# Patient Record
Sex: FEMALE | Race: BLACK OR AFRICAN AMERICAN
[De-identification: names, ages, dates, MRNs, and addresses within clinical notes are randomized per-mention and may not be internally consistent; named-entity substitution may affect disease eponyms.]

---

## 2017-07-23 ENCOUNTER — HOSPITAL ENCOUNTER (EMERGENCY)
Dept: HOSPITAL 61 - ER | Age: 57
Discharge: HOME | End: 2017-07-23
Payer: COMMERCIAL

## 2017-07-23 VITALS — DIASTOLIC BLOOD PRESSURE: 105 MMHG | SYSTOLIC BLOOD PRESSURE: 165 MMHG

## 2017-07-23 VITALS — HEIGHT: 67 IN | BODY MASS INDEX: 20.4 KG/M2 | WEIGHT: 130 LBS
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
## 2017-07-23 DIAGNOSIS — M54.5: Primary | ICD-10-CM
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
## 2017-07-23 PROCEDURE — 99281 EMR DPT VST MAYX REQ PHY/QHP: CPT
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
## 2017-11-14 ENCOUNTER — HOSPITAL ENCOUNTER (EMERGENCY)
Dept: HOSPITAL 61 - ER | Age: 57
Discharge: HOME | End: 2017-11-14
Payer: COMMERCIAL

## 2017-11-14 VITALS — SYSTOLIC BLOOD PRESSURE: 144 MMHG | DIASTOLIC BLOOD PRESSURE: 92 MMHG

## 2017-11-14 VITALS — BODY MASS INDEX: 19.26 KG/M2 | HEIGHT: 69 IN | WEIGHT: 130 LBS
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
## 2017-11-14 DIAGNOSIS — I10: ICD-10-CM
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
## 2017-11-14 DIAGNOSIS — S63.501A: Primary | ICD-10-CM

## 2017-11-14 PROCEDURE — 29125 APPL SHORT ARM SPLINT STATIC: CPT

## 2017-11-14 PROCEDURE — 73110 X-RAY EXAM OF WRIST: CPT

## 2017-11-14 NOTE — RAD
Right wrist, 3 views, 11/14/2017:



History: Wrist pain



No acute fracture or dislocation is identified. There is an old healed fifth

metacarpal fracture. Small calcific densities in the soft tissues along the

ulnar aspect of the wrist appear old. There are mild degenerative changes at

the radiocarpal articulation and the first CMC joint. Cystic and sclerotic

changes in the lunate bone are probably on a degenerative basis. Old avascular

necrosis is less likely.





IMPRESSION:

1. Scattered degenerative changes as described above.

2. No acute bony abnormality is detected.

## 2017-11-14 NOTE — PHYS DOC
Past Medical History


Past Medical History:  Hypertension, Other


Past Surgical History:  Other


Additional Past Surgical Histo:  CARPAL TUNNEL RELEASE, TRIGGER FINGER RELEASE, 

L KNEE SHAVING


Alcohol Use:  Occasionally


Drug Use:  None





Adult General


Chief Complaint


Chief Complaint:  WRIST PAIN





HPI


HPI





Patient is a 57  year old female who presents here today complaining of right 

wrist pain that started approximately 2 days ago. Patient reports she was 

downstairs working on laundry in her basement 2 days ago when she thinks she 

must and picked up her laundry basket funny and started developing pain to her 

right wrist. Patient has no other complaints at this time. Patient has any 

weakness to her hand or wrist however she does have pain to the medial and 

lateral aspect of her distal radius and ulna. Patient denies any history 

significant for hypertension diabetes lung liver or kidney problems. Patient 

reports that lately and she's taken assist herself with the pain is shot of 

vodka last night and help her get some sleep however she reports that the shot 

of vodka did not do anything for pain nor did help her get any sleep. Patient 

is concerned because she is just finishing up in 5 days of medication and she 

is history of back to work tomorrow and is needing a work note because she was 

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





Review of systems:


Constitutional: Denies fever or chills 


Eyes: Denies change in visual acuity, redness, or eye pain 


HENT: Denies nasal congestion or sore throat 


Respiratory: Denies cough or shortness of breath 





Physical exam


Constitutional: Well developed, well nourished, no acute distress, non-toxic 

appearance. 


HENT: Normocephalic, atraumatic, bilateral external ears normal, oropharynx 

moist, no oral exudates, nose normal. 


Eyes: PERRLA, EOMI, conjunctiva normal, no discharge.  


Neck: Normal range of motion, no tenderness, supple, no stridor.  


Cardiovascular:Heart rate regular rhythm, 


Lungs & Thorax:  Bilateral breath sounds clear to auscultation 


Abdomen: Bowel sounds normal, soft, no tenderness, no masses, no pulsatile 

masses.  


Skin: Warm, dry, no erythema, no rash.  


Back: No tenderness, no CVA tenderness.  


Extremities: No tenderness, no cyanosis, no clubbing, ROM intact, no edema.  


Neurologic: Alert and oriented X 3, normal motor function, normal sensory 

function, no focal deficits noted. 


Psychologic: Affect normal, judgement normal, mood normal. 








Patient's ER physical exam is significant for tenderness to palpation to her 

right lateral medial aspects of her wrist. Patient has no soft tissue swelling. 

Patient is neurovascularly intact. Patient has no bony deformity. Patient's 

sensation and motor strength are intact except limited secondary to her 

discomfort and pain.





X-ray right wrist:





Assessment and plan


This is a 57-year-old female who presents here today requesting assistance with 

discomfort to her right wrist that she sustained after lifting a heavy laundry 

basket. Patient is also requesting a work note because she does not think she'

ll be able go to work today secondary to the pain in her right wrist. Patient 

reports that she was having a propane tanks at work.





Current Medications


Current Medications





Current Medications








 Medications


  (Trade)  Dose


 Ordered  Sig/Santos  Start Time


 Stop Time Status Last Admin


Dose Admin


 


 Ibuprofen


  (Motrin)  600 mg  1X  ONCE  11/14/17 06:30


 11/14/17 06:31 DC 11/14/17 06:14


600 MG











Allergies


Allergies





Allergies








Coded Allergies Type Severity Reaction Last Updated Verified


 


  No Known Drug Allergies    5/4/14 No











Current Patient Data


Vital Signs





 Vital Signs








  Date Time  Temp Pulse Resp B/P (MAP) Pulse Ox O2 Delivery O2 Flow Rate FiO2


 


11/14/17 05:49 98.4 88 16  96 Room Air  





 98.4       











EKG


EKG


[]





Radiology/Procedures


Radiology/Procedures


[]





Course & Med Decision Making


Course & Med Decision Making


Pertinent Labs and Imaging studies reviewed. (See chart for details)





[]





Dragon Disclaimer


Dragon Disclaimer


This electronic medical record was generated, in whole or in part, using a 

voice recognition dictation system.





Departure


Departure


Impression:  


 Primary Impression:  


 Right wrist sprain


Disposition:  01 HOME, SELF-CARE


Condition:  IMPROVED


Referrals:  


UNKNOWN PCP NAME (PCP)


Patient Instructions:  Cast or Splint Care, Wrist Sprain with [facility]


Scripts


Ibuprofen (IBUPROFEN) 600 Mg Tablet


600 MG PO PRN Q6HRS Y for PAIN, #20 TAB


   Prov: AHMET WEEKS MD         11/14/17





Problem Qualifiers








 Primary Impression:  


 Right wrist sprain


 Encounter type:  initial encounter  Qualified Codes:  S63.501A - Unspecified 

sprain of right wrist, initial encounter








AHMET WEEKS MD Nov 14, 2017 06:00

## 2018-02-02 ENCOUNTER — HOSPITAL ENCOUNTER (EMERGENCY)
Dept: HOSPITAL 61 - ER | Age: 58
Discharge: HOME | End: 2018-02-02
Payer: COMMERCIAL
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
## 2018-02-02 DIAGNOSIS — S39.012A: Primary | ICD-10-CM

## 2018-02-02 PROCEDURE — 99283 EMERGENCY DEPT VISIT LOW MDM: CPT

## 2019-08-17 ENCOUNTER — HOSPITAL ENCOUNTER (EMERGENCY)
Dept: HOSPITAL 61 - ER | Age: 59
Discharge: HOME | End: 2019-08-17
Payer: COMMERCIAL

## 2019-08-17 VITALS — WEIGHT: 133 LBS | HEIGHT: 69 IN | BODY MASS INDEX: 19.7 KG/M2

## 2019-08-17 VITALS — SYSTOLIC BLOOD PRESSURE: 154 MMHG | DIASTOLIC BLOOD PRESSURE: 79 MMHG

## 2019-08-17 DIAGNOSIS — R53.83: ICD-10-CM

## 2019-08-17 DIAGNOSIS — F17.200: ICD-10-CM

## 2019-08-17 DIAGNOSIS — R06.02: ICD-10-CM

## 2019-08-17 DIAGNOSIS — R07.89: Primary | ICD-10-CM

## 2019-08-17 LAB
ALBUMIN SERPL-MCNC: 3.6 G/DL (ref 3.4–5)
ALBUMIN/GLOB SERPL: 1.2 {RATIO} (ref 1–1.7)
ALP SERPL-CCNC: 78 U/L (ref 46–116)
ALT SERPL-CCNC: 28 U/L (ref 14–59)
AMORPH SED URNS QL MICRO: PRESENT /HPF
AMPHETAMINE/METHAMPHETAMINE: (no result)
ANION GAP SERPL CALC-SCNC: 11 MMOL/L (ref 6–14)
APTT PPP: YELLOW S
AST SERPL-CCNC: 20 U/L (ref 15–37)
BACTERIA #/AREA URNS HPF: (no result) /HPF
BARBITURATES UR-MCNC: (no result) UG/ML
BASOPHILS # BLD AUTO: 0.1 X10^3/UL (ref 0–0.2)
BASOPHILS NFR BLD: 1 % (ref 0–3)
BENZODIAZ UR-MCNC: (no result) UG/L
BILIRUB SERPL-MCNC: 0.2 MG/DL (ref 0.2–1)
BILIRUB UR QL STRIP: NEGATIVE
BUN SERPL-MCNC: 16 MG/DL (ref 7–20)
BUN/CREAT SERPL: 23 (ref 6–20)
CALCIUM SERPL-MCNC: 9.1 MG/DL (ref 8.5–10.1)
CANNABINOIDS UR-MCNC: (no result) UG/L
CHLORIDE SERPL-SCNC: 106 MMOL/L (ref 98–107)
CO2 SERPL-SCNC: 27 MMOL/L (ref 21–32)
COCAINE UR-MCNC: (no result) NG/ML
CREAT SERPL-MCNC: 0.7 MG/DL (ref 0.6–1)
EOSINOPHIL NFR BLD: 0.1 X10^3/UL (ref 0–0.7)
EOSINOPHIL NFR BLD: 1 % (ref 0–3)
ERYTHROCYTE [DISTWIDTH] IN BLOOD BY AUTOMATED COUNT: 13.8 % (ref 11.5–14.5)
FIBRINOGEN PPP-MCNC: CLEAR MG/DL
GFR SERPLBLD BASED ON 1.73 SQ M-ARVRAT: 104.4 ML/MIN
GLOBULIN SER-MCNC: 3 G/DL (ref 2.2–3.8)
GLUCOSE SERPL-MCNC: 118 MG/DL (ref 70–99)
HCT VFR BLD CALC: 43.1 % (ref 36–47)
HGB BLD-MCNC: 14.9 G/DL (ref 12–15.5)
LIPASE: 147 U/L (ref 73–393)
LYMPHOCYTES # BLD: 2.2 X10^3/UL (ref 1–4.8)
LYMPHOCYTES NFR BLD AUTO: 26 % (ref 24–48)
MCH RBC QN AUTO: 31 PG (ref 25–35)
MCHC RBC AUTO-ENTMCNC: 35 G/DL (ref 31–37)
MCV RBC AUTO: 90 FL (ref 79–100)
METHADONE SERPL-MCNC: (no result) NG/ML
MONO #: 0.8 X10^3/UL (ref 0–1.1)
MONOCYTES NFR BLD: 10 % (ref 0–9)
NEUT #: 5.3 X10^3/UL (ref 1.8–7.7)
NEUTROPHILS NFR BLD AUTO: 63 % (ref 31–73)
NITRITE UR QL STRIP: NEGATIVE
OPIATES UR-MCNC: (no result) NG/ML
PCP SERPL-MCNC: (no result) MG/DL
PH UR STRIP: 6 [PH]
PLATELET # BLD AUTO: 268 X10^3/UL (ref 140–400)
POTASSIUM SERPL-SCNC: 3.9 MMOL/L (ref 3.5–5.1)
PROT SERPL-MCNC: 6.6 G/DL (ref 6.4–8.2)
PROT UR STRIP-MCNC: NEGATIVE MG/DL
RBC # BLD AUTO: 4.81 X10^6/UL (ref 3.5–5.4)
RBC #/AREA URNS HPF: (no result) /HPF (ref 0–2)
SODIUM SERPL-SCNC: 144 MMOL/L (ref 136–145)
SQUAMOUS #/AREA URNS LPF: (no result) /LPF
UROBILINOGEN UR-MCNC: 0.2 MG/DL
WBC # BLD AUTO: 8.5 X10^3/UL (ref 4–11)
WBC #/AREA URNS HPF: 0 /HPF (ref 0–4)

## 2019-08-17 PROCEDURE — 36415 COLL VENOUS BLD VENIPUNCTURE: CPT

## 2019-08-17 PROCEDURE — 81001 URINALYSIS AUTO W/SCOPE: CPT

## 2019-08-17 PROCEDURE — 96374 THER/PROPH/DIAG INJ IV PUSH: CPT

## 2019-08-17 PROCEDURE — 85379 FIBRIN DEGRADATION QUANT: CPT

## 2019-08-17 PROCEDURE — 71045 X-RAY EXAM CHEST 1 VIEW: CPT

## 2019-08-17 PROCEDURE — 99285 EMERGENCY DEPT VISIT HI MDM: CPT

## 2019-08-17 PROCEDURE — 93005 ELECTROCARDIOGRAM TRACING: CPT

## 2019-08-17 PROCEDURE — 84484 ASSAY OF TROPONIN QUANT: CPT

## 2019-08-17 PROCEDURE — 80307 DRUG TEST PRSMV CHEM ANLYZR: CPT

## 2019-08-17 PROCEDURE — 85025 COMPLETE CBC W/AUTO DIFF WBC: CPT

## 2019-08-17 PROCEDURE — 80053 COMPREHEN METABOLIC PANEL: CPT

## 2019-08-17 PROCEDURE — 83690 ASSAY OF LIPASE: CPT

## 2019-08-17 NOTE — EKG
[facility]

              [address] [place] [address]

Test Date:    2019               Test Time:    11:31:24

Pat Name:     JULIANA MURDOCK      Department:   

Patient ID:   [id number]           Room:          

Gender:       F                        Technician:   

:          1960               Requested By: GREGG MULLIGAN

Order Number: [id number]           Reading MD:   Liu Brewer MD

                                 Measurements

Intervals                              Axis          

Rate:         52                       P:            49

SD:           192                      QRS:          2

QRSD:         70                       T:            58

QT:           400                                    

QTc:          374                                    

                           Interpretive Statements

SINUS RHYTHM

QRS(T) CONTOUR ABNORMALITY

CONSISTENT WITH ANTEROSEPTAL INFARCT



Electronically Signed On 2019 9:44:24 CDT by Liu Brewer MD

## 2019-08-17 NOTE — RAD
EXAM: AP View of the chest

 

DATE: 8/17/2019 11:31 AM

 

INDICATION: Chest pain x1 day

 

COMPARISON: No Prior

 

FINDINGS:

 

 

The heart is not enlarged.

 

Mediastinal and hilar contours are normal.

 

Emphysematous changes. No lobar consolidation.

 

No pleural effusion or pneumothorax.

 

Glenohumeral joint osteophytes arthritis

 

IMPRESSION:

1.  No radiographic evidence for acute cardiopulmonary process.

 

Electronically signed by: Juanjo Carlson MD (8/17/2019 12:42 PM) [facility]

## 2019-08-17 NOTE — EKG
[facility]

              [address] [place] [address]

Test Date:    2019               Test Time:    14:53:04

Pat Name:     JULIANA MURDOCK      Department:   

Patient ID:   [id number]           Room:          

Gender:       F                        Technician:   

:          1960               Requested By: GREGG MULLIGAN

Order Number: [id number]           Reading MD:   Liu Brewer MD

                                 Measurements

Intervals                              Axis          

Rate:         49                       P:            41

[place]:           196                      QRS:          15

QRSD:         70                       T:            61

QT:           430                                    

QTc:          387                                    

                           Interpretive Statements

SINUS BRADYCARDIA

CONSIDER ANTEROSEPTAL INFARCT

Electronically Signed On 2019 9:45:03 CDT by Liu Brewer MD

## 2019-08-17 NOTE — PHYS DOC
Past Medical History


Past Medical History:  No Pertinent History


Past Surgical History:  Other


Additional Past Surgical Histo:  CARPAL TUNNEL RELEASE, TRIGGER FINGER RELEASE, 

L KNEE SHAVING


Alcohol Use:  Rarely


Drug Use:  None





Adult General


Chief Complaint


Chief Complaint:  CHEST PAIN





HPI


HPI





Patient is a 59  year old female who presents with chest pain that started last 

night that is a pressure and sharp but comes and goes and is worse with 

inspiration. Patient states she also has shortness of breath. Patient states 

that over the last week she's felt more fatigued. Patient rates her pain at a 

5/10.





Review of Systems


Review of Systems





Constitutional: Fatigued. Denies fever or chills []


Eyes: Denies change in visual acuity, redness, or eye pain []


HENT: Denies nasal congestion or sore throat []


Respiratory: Denies cough or shortness of breath []


Cardiovascular: Right sided chest pain


GI: Denies abdominal pain, nausea, vomiting, bloody stools or diarrhea []


: Denies dysuria or hematuria []


Musculoskeletal: Denies back pain or joint pain []


Integument: Denies rash or skin lesions []


Neurologic: Denies headache, focal weakness or sensory changes []


Endocrine: Denies polyuria or polydipsia []





All other systems were reviewed and found to be within normal limits, except as 

documented in this note.





Current Medications


Current Medications





Current Medications








 Medications


  (Trade)  Dose


 Ordered  Sig/Santos  Start Time


 Stop Time Status Last Admin


Dose Admin


 


 Aspirin


  (Kyleigh Aspirin)  325 mg  1X  ONCE  19 11:45


 19 11:46 DC 19 12:09


325 MG


 


 Ketorolac


 Tromethamine


  (Toradol 30mg


 Vial)  30 mg  1X  ONCE  19 12:30


 19 12:31 DC 19 12:34


30 MG


 


 Sodium Chloride  1,000 ml @ 


 1,000 mls/hr  1X  ONCE  19 13:15


 19 14:14 DC 19 13:06


1,000 MLS/HR











Allergies


Allergies





Allergies








Coded Allergies Type Severity Reaction Last Updated Verified


 


  No Known Drug Allergies    14 No











Physical Exam


Physical Exam





Constitutional: Well developed, well nourished, no acute distress, non-toxic 

appearance. []


HENT: Normocephalic, atraumatic, bilateral external ears normal, oropharynx mo

ist, no oral exudates, nose normal. []


Eyes: PERRLA, EOMI, conjunctiva normal, no discharge. [] 


Neck: Normal range of motion, no tenderness, supple, no stridor. [] 


Cardiovascular:Heart rate regular rhythm, Sinus Rhythm, no STEMI. no murmur []


Lungs & Thorax:  Bilateral breath sounds clear to auscultation []


Abdomen: Bowel sounds normal, soft, no tenderness, no masses, no pulsatile 

masses. [] 


Skin: Warm, dry, no erythema, no rash. [] 


Back: No tenderness, no CVA tenderness. [] 


Extremities: No tenderness, no cyanosis, no clubbing, ROM intact, no edema. [] 


Neurologic: Alert and oriented X 3, normal motor function, normal sensory 

function, no focal deficits noted. []


Psychologic: Affect normal, judgement normal, mood normal. []





Current Patient Data


Vital Signs





                                   Vital Signs








  Date Time  Temp Pulse Resp B/P (MAP) Pulse Ox O2 Delivery O2 Flow Rate FiO2


 


19 14:04  56 16 162/71 (101) 99 Room Air  


 


19 11:25 98.0       





 98.0       








Lab Values





                                Laboratory Tests








Test


 19


11:45 19


12:50


 


White Blood Count


 8.5 x10^3/uL


(4.0-11.0) 





 


Red Blood Count


 4.81 x10^6/uL


(3.50-5.40) 





 


Hemoglobin


 14.9 g/dL


(12.0-15.5) 





 


Hematocrit


 43.1 %


(36.0-47.0) 





 


Mean Corpuscular Volume


 90 fL ()


 





 


Mean Corpuscular Hemoglobin 31 pg (25-35)   


 


Mean Corpuscular Hemoglobin


Concent 35 g/dL


(31-37) 





 


Red Cell Distribution Width


 13.8 %


(11.5-14.5) 





 


Platelet Count


 268 x10^3/uL


(140-400) 





 


Neutrophils (%) (Auto) 63 % (31-73)   


 


Lymphocytes (%) (Auto) 26 % (24-48)   


 


Monocytes (%) (Auto) 10 % (0-9)  H 


 


Eosinophils (%) (Auto) 1 % (0-3)   


 


Basophils (%) (Auto) 1 % (0-3)   


 


Neutrophils # (Auto)


 5.3 x10^3/uL


(1.8-7.7) 





 


Lymphocytes # (Auto)


 2.2 x10^3/uL


(1.0-4.8) 





 


Monocytes # (Auto)


 0.8 x10^3/uL


(0.0-1.1) 





 


Eosinophils # (Auto)


 0.1 x10^3/uL


(0.0-0.7) 





 


Basophils # (Auto)


 0.1 x10^3/uL


(0.0-0.2) 





 


D-Dimer (Zahraa)


 < 0.27


ug/mlFEU 





 


Sodium Level


 144 mmol/L


(136-145) 





 


Potassium Level


 3.9 mmol/L


(3.5-5.1) 





 


Chloride Level


 106 mmol/L


() 





 


Carbon Dioxide Level


 27 mmol/L


(21-32) 





 


Anion Gap 11 (6-14)   


 


Blood Urea Nitrogen


 16 mg/dL


(7-20) 





 


Creatinine


 0.7 mg/dL


(0.6-1.0) 





 


Estimated GFR


(Cockcroft-Gault) 104.4  


 





 


BUN/Creatinine Ratio 23 (6-20)  H 


 


Glucose Level


 118 mg/dL


(70-99)  H 





 


Calcium Level


 9.1 mg/dL


(8.5-10.1) 





 


Total Bilirubin


 0.2 mg/dL


(0.2-1.0) 





 


Aspartate Amino Transferase


(AST) 20 U/L (15-37)


 





 


Alanine Aminotransferase (ALT)


 28 U/L (14-59)


 





 


Alkaline Phosphatase


 78 U/L


() 





 


Troponin I Quantitative


 < 0.017 ng/mL


(0.000-0.055) 





 


Total Protein


 6.6 g/dL


(6.4-8.2) 





 


Albumin


 3.6 g/dL


(3.4-5.0) 





 


Albumin/Globulin Ratio 1.2 (1.0-1.7)   


 


Lipase


 147 U/L


() 





 


Urine Collection Type  Unknown  


 


Urine Color  Yellow  


 


Urine Clarity  Clear  


 


Urine pH  6.0  


 


Urine Specific Gravity  1.025  


 


Urine Protein


 


 Negative mg/dL


(NEG-TRACE)


 


Urine Glucose (UA)


 


 Negative mg/dL


(NEG)


 


Urine Ketones (Stick)


 


 Negative mg/dL


(NEG)


 


Urine Blood


 


 Negative (NEG)





 


Urine Nitrite


 


 Negative (NEG)





 


Urine Bilirubin


 


 Negative (NEG)





 


Urine Urobilinogen Dipstick


 


 0.2 mg/dL (0.2


mg/dL)


 


Urine Leukocyte Esterase


 


 Negative (NEG)





 


Urine RBC


 


 Occ /HPF (0-2)





 


Urine WBC  0 /HPF (0-4)  


 


Urine Squamous Epithelial


Cells 


 None /LPF  





 


Urine Amorphous Sediment  Present /HPF  


 


Urine Bacteria


 


 Few /HPF


(0-FEW)


 


Urine Mucus  Marked /LPF  


 


Urine Opiates Screen  Neg (NEG)  


 


Urine Methadone Screen  Neg (NEG)  


 


Urine Barbiturates  Neg (NEG)  


 


Urine Phencyclidine Screen  Neg (NEG)  


 


Urine


Amphetamine/Methamphetamine 


 Neg (NEG)  





 


Urine Benzodiazepines Screen  Neg (NEG)  


 


Urine Cocaine Screen  Neg (NEG)  


 


Urine Cannabinoids Screen  Pos (NEG)  


 


Urine Ethyl Alcohol  Neg (NEG)  





                                Laboratory Tests


19 11:45








                                Laboratory Tests


19 11:45











EKG


EKG


Sinus Rhythm and no STEMI


Interpretation Time:


1131 and read by Dr Reyes





Radiology/Procedures


Impressions:


[facility]


                    [address] [place] [address]


                                 [phone]


                                        


                                 IMAGING REPORT





                                     Signed





PATIENT: JULIANA MURDOCK AACCOUNT: [id number]     MRN#: [id number]


: 1960           LOCATION: ER              AGE: 59


SEX: F                    EXAM DT: 19         ACCESSION#: [id number]


STATUS: REG ER            ORD. PHYSICIAN: GREGG MULLIGAN


REASON: chest pain X1 day


PROCEDURE: PORTABLE CHEST 1V





EXAM: AP View of the chest


 


DATE: 2019 11:31 AM


 


INDICATION: Chest pain x1 day


 


COMPARISON: No Prior


 


FINDINGS:


 


 


The heart is not enlarged.


 


Mediastinal and hilar contours are normal.


 


Emphysematous changes. No lobar consolidation.


 


No pleural effusion or pneumothorax.


 


Glenohumeral joint osteophytes arthritis


 


IMPRESSION:


1.  No radiographic evidence for acute cardiopulmonary process.


 


Electronically signed by: Juanjo Thomas MD (2019 12:42 PM) [facility]














DICTATED and SIGNED BY:     JUANJO HTOMAS MD


DATE:     19 1242








Course & Med Decision Making


Course & Med Decision Making


Patient is a 59  year old female who presents with chest pain that started last 

night that is a pressure and sharp but comes and goes and is worse with 

inspiration. Patient states she also has shortness of breath. Patient states 

that over the last week she's felt more fatigued. Patient rates her pain at a 

5/10. States she takes no medications daily. Smoker but denies drug use. Denies 

nausea, vomiting, numbness or tingling, dizziness, visual changes, headache, 

syncope, abdominal pain. Abdomen is soft and nontender. Lungs are clear to 

auscultation all lobes. EKG shows sinus rhythm and no STEMI. No extremity 

swelling. Skin is pink warm and dry. Speaks in full clear sentences. Ambulatory 

with steady gait. PERRLA. Denies any weaknesses. Afebrile and vital signs are 

within normal limits. Mucous membranes are moist.





Chest x-ray shows no acute findings. Blood work is unremarkable. Troponin is 

negative. D-dimer is negative. Heart score 2. Patient states the Toradol took 

off her pain away. Patient rates her pain as 0 out of 10.





Second EKG shows sinus bradycardia and no STEMI. Second troponin is negative. 

Patient discharged home and follow-up with her primary care provider.





Dragon Disclaimer


Dragon Disclaimer


This electronic medical record was generated, in whole or in part, using a voice

 recognition dictation system.





The HEART Score for CP Pts


HEART Score for Chest Pain:  








HEART Score for Chest Pain Response (Comments) Value


 


History Slighlty/Non-Suspicious 0


 


ECG Normal 0


 


Age >45 - < 65 1


 


Risk Factors                            1 or 2 Risk Factors 1


 


Troponin < Normal Limit 0


 


Total  2








Risk Factors:


Risk Factors:  DM, Current or recent (<one month) smoker, HTN, HLP, family 

history of CAD, obesity.


Risk Scores:


Score 0 - 3:  2.5% MACE over next 6 weeks - Discharge Home


Score 4 - 6:  20.3% MACE over next 6 weeks - Admit for Clinical Observation


Score 7 - 10:  72.7% MACE over next 6 weeks - Early Invasive Strategies





Departure


Departure


Impression:  


   Primary Impression:  


   Chest pain


Disposition:   HOME, SELF-CARE


Condition:  STABLE


Referrals:  


UNKNOWN PCP NAME (PCP)


Patient Instructions:  Chest Pain (Nonspecific)





Additional Instructions:  


Follow-up with her primary care provider.


Scripts


Ibuprofen (IBUPROFEN) 600 Mg Tablet


600 MG PO PRN Q6HRS PRN for INFLAMMATION, #20 TAB


   Prov: GREGG MULLIGAN APRN         19





Problem Qualifiers








   Primary Impression:  


   Chest pain


   Chest pain type:  unspecified  Qualified Codes:  R07.9 - Chest pain, 

   unspecified








GREGG MULLIGAN APRN            Aug 17, 2019 11:51

## 2020-07-17 ENCOUNTER — HOSPITAL ENCOUNTER (EMERGENCY)
Dept: HOSPITAL 61 - ER | Age: 60
Discharge: HOME | End: 2020-07-17
Payer: COMMERCIAL

## 2020-07-17 VITALS — WEIGHT: 130.07 LBS | HEIGHT: 67 IN | BODY MASS INDEX: 20.42 KG/M2

## 2020-07-17 VITALS — SYSTOLIC BLOOD PRESSURE: 144 MMHG | DIASTOLIC BLOOD PRESSURE: 79 MMHG

## 2020-07-17 DIAGNOSIS — M25.571: ICD-10-CM
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
## 2020-07-17 DIAGNOSIS — T63.441A: Primary | ICD-10-CM

## 2020-07-17 DIAGNOSIS — F17.200: ICD-10-CM

## 2020-07-17 PROCEDURE — 99283 EMERGENCY DEPT VISIT LOW MDM: CPT

## 2020-07-17 NOTE — PHYS DOC
Past Medical History


Past Medical History:  No Pertinent History


Past Surgical History:  Other


Additional Past Surgical Histo:  CARPAL TUNNEL RELEASE, TRIGGER FINGER RELEASE, 

L KNEE SHAVING


Smoking Status:  Current Every Day Smoker


Alcohol Use:  Occasionally


Drug Use:  None





General Adult


EDM:


Chief Complaint:  ANKLE PROBLEM





HPI:


HPI:


60-year-old female presented emergency department today after getting a bee 

sting on the medial aspect of her right ankle.  It happened yesterday.  Since 

then she has had some swelling with pain.  She does not believe she will be able

to work today.  Her pain is a throbbing aching pain that is nonradiating without

alleviating factors.  She denies any fevers.  She has had some redness around 

the area.





Review of systems negative for headache vomiting fevers chills.  All other 

review of systems negative





ED course: 60-year-old female presenting with a bee sting from yesterday.  We 

will start her on Keflex and Benadryl.  Will provide a work note today.  She is 

to return for any difficulty breathing or worsening rash or development of 

fever.





Heart Score:


Risk Factors:


Risk Factors:  DM, Current or recent (<one month) smoker, HTN, HLP, family 

history of CAD, obesity.


Risk Scores:


Score 0 - 3:  2.5% MACE over next 6 weeks - Discharge Home


Score 4 - 6:  20.3% MACE over next 6 weeks - Admit for Clinical Observation


Score 7 - 10:  72.7% MACE over next 6 weeks - Early Invasive Strategies





Allergies:


Allergies:





Allergies








Coded Allergies Type Severity Reaction Last Updated Verified


 


  No Known Drug Allergies    5/4/14 No











Physical Exam:


PE:





Constitutional: Well developed, well nourished, no acute distress, non-toxic 

appearance. []


HENT: Normocephalic, atraumatic, bilateral external ears normal, oropharynx 

moist, no oral exudates, nose normal. []


Eyes: PERRLA, EOMI, conjunctiva normal, no discharge. [] 


Neck: Normal range of motion, no tenderness, supple, no stridor. [] 


Cardiovascular:Heart rate regular rhythm, no murmur []


Lungs & Thorax:  Bilateral breath sounds clear to auscultation []


Abdomen: Bowel sounds normal, soft, no tenderness, no masses, no pulsatile 

masses. [] 


Skin: Warm, dry, no erythema, no rash. [] 


Back: No tenderness, no CVA tenderness. [] 


Extremities: 


The patient's right lower extremity has some swelling on the medial aspect of 

the ankle.  There is some redness.  It is mildly tender to touch.  Normal 

neurovascular status with using cap refill and palpable pulse.


Neurologic: Alert and oriented X 3, normal motor function, normal sensory 

function, no focal deficits noted. []


Psychologic: Affect normal, judgement normal, mood normal. []





EKG:


EKG:


[]





Radiology/Procedures:


Radiology/Procedures:


[]





Course & Med Decision Making:


Course & Med Decision Making


Pertinent Labs and Imaging studies reviewed. (See chart for details)





[]





Dragon Disclaimer:


Dragon Disclaimer:


This electronic medical record was generated, in whole or in part, using a voice

 recognition dictation system.





Departure


Departure


Impression:  


   Primary Impression:  


   Bee sting reaction


Disposition:  01 HOME, SELF-CARE


Condition:  STABLE


Referrals:  


UNKNOWN PCP NAME (PCP)


Patient Instructions:  Bee, Wasp, or Hornet Sting


Scripts


Cephalexin (KEFLEX) 250 Mg Capsule


2 CAP PO TID for 5 Days, #30 CAP 0 Refills


   Prov: ELE SERRANO MD         7/17/20 


Diphenhydramine Hcl (BENADRYL) 25 Mg Capsule


1 CAP PO QIDPRN for ITCHING, #20 CAP 0 Refills


   Prov: ELE SERRANO MD         7/17/20





Justicifation of Admission Dx:


Justifications for Admission:


Justification of Admission Dx:  N/A











ELE SERRANO MD               Jul 17, 2020 07:10